# Patient Record
(demographics unavailable — no encounter records)

---

## 2025-01-07 NOTE — HISTORY OF PRESENT ILLNESS
[EENT/Resp Symptoms] : EENT/RESPIRATORY SYMPTOMS [Nasal congestion] : nasal congestion [___ Day(s)] : [unfilled] day(s) [Fever] : fever [Nasal Congestion] : nasal congestion [Cough] : cough [Max Temp: ____] : Max temperature: [unfilled] [de-identified] : fever [FreeTextEntry6] : Yamilka initially developed flu-like symptoms including body aches and fever starting about 4 days ago. Her father reports that Yamilka tested negative for COVID-19 and flu at that time. Last Friday, she experienced significant fatigue, sleeping most of the day. Diarrhea began shortly after, occurring 2-3 times daily but not severe. The fever initially broke for about a day and a half, but returned last night, prompting the father's concern. The father notes that Yamilka has been experiencing a dry cough.

## 2025-01-07 NOTE — PHYSICAL EXAM
[Acute Distress] : no acute distress [Alert] : alert [Tired appearing] : tired appearing [Lethargic] : not lethargic [Toxic] : not toxic [Clear] : right tympanic membrane clear [Clear Rhinorrhea] : clear rhinorrhea [NL] : warm, clear

## 2025-01-07 NOTE — HISTORY OF PRESENT ILLNESS
[EENT/Resp Symptoms] : EENT/RESPIRATORY SYMPTOMS [Nasal congestion] : nasal congestion [___ Day(s)] : [unfilled] day(s) [Fever] : fever [Nasal Congestion] : nasal congestion [Cough] : cough [Max Temp: ____] : Max temperature: [unfilled] [de-identified] : fever [FreeTextEntry6] : Yamilka initially developed flu-like symptoms including body aches and fever starting about 4 days ago. Her father reports that Yamilka tested negative for COVID-19 and flu at that time. Last Friday, she experienced significant fatigue, sleeping most of the day. Diarrhea began shortly after, occurring 2-3 times daily but not severe. The fever initially broke for about a day and a half, but returned last night, prompting the father's concern. The father notes that Yamilka has been experiencing a dry cough.

## 2025-01-07 NOTE — DISCUSSION/SUMMARY
[FreeTextEntry1] : Objective:   - **Diagnostic Results:** Positive test for Influenza A  Assessment and Plan:   - **Influenza A:** Patient has tested positive for Influenza A, presenting with typical symptoms including fever, cough, congestion, and fatigue. The waxing and waning pattern of fever is consistent with the flu.     - Therapeutic Interventions: Continue symptomatic treatment with Tylenol for fever. Avoid ibuprofen due to gastrointestinal symptoms.      - Patient Education: Advised to drink plenty of water and eat light foods like crackers or toast.      - Home Care: Recommended use of a humidifier and saline nebulizer twice daily for dry cough.      - Follow-Up: Patient to stay home from school for the entire week. Mother instructed to monitor for signs of secondary infections such as pneumonia or ear infections.      - Return to School: Cleared to return to school on Monday with a doctor's note.

## 2025-02-10 NOTE — HISTORY OF PRESENT ILLNESS
[de-identified] : GI Symptoms [FreeTextEntry6] : Child has been vomiting since Friday, stomachache, diarrhea, dad noticed blood in stool. TMAX at home 102.5- yesterday

## 2025-02-10 NOTE — HISTORY OF PRESENT ILLNESS
[de-identified] : GI Symptoms [FreeTextEntry6] : Child has been vomiting since Friday, stomachache, diarrhea, dad noticed blood in stool. TMAX at home 102.5- yesterday

## 2025-02-13 NOTE — PHYSICAL EXAM
[Alert] : alert [Clear] : right tympanic membrane clear [Soft] : soft [Normal Bowel Sounds] : normal bowel sounds [Normotonic] : normotonic [NL] : warm, clear [Acute Distress] : no acute distress [Tired appearing] : not tired appearing [Toxic] : not toxic [Tender] : nontender [Distended] : nondistended [Hepatosplenomegaly] : no hepatosplenomegaly [Tenderness with Palpation] : no tenderness with palpation [McBurney's point tenderness] : no McBurney's point tenderness [Rebound tenderness] : no rebound tenderness [Psoas Sign Positive] : psoas sign negative [Obturator Sign Positive] : obturator sign negative

## 2025-02-13 NOTE — DISCUSSION/SUMMARY
[FreeTextEntry1] : Assessment and Plan:   - **Bloody Stools:**    - **assess:** It was assessed that Yamilka's abdominal discomfort resolved on its own with unclear cause. Diagnostics including inflammatory markers and complete blood count were ordered to be available the next day. If everything is ok with the blood work, then there's a chance of low likelihood of ongoing inflammation or an inflammatory process. Will be followed up through the weekend.     - Therapeutic Interventions: Current plan is observational and symptomatic treatment.      - Diagnostic Tests: Inflammatory markers, Complete Blood Count, possible stool test for bacterial panel ordered.      - Referrals: Referred to GI     - Patient Education: Advised on the possible reasons for her symptoms and the current plan of action      - Follow-Up: Planned to follow up after results of investigations are out.    - **Skin rash/Dermatitis:**    - **assess:** It was assessed that Yamilka's skin rash is likely due to the temperature changes affecting her dry skin. Advised to moisturize with coconut oil.     - Therapeutic Interventions: Recommended application of coconut oil to the affected area.      - Patient Education: Explained the possible cause of the rash and it's management.      - Follow-Up: As needed for her dry skin

## 2025-02-13 NOTE — HISTORY OF PRESENT ILLNESS
[de-identified] : bloody stools [FreeTextEntry6] : Yamilka presented with two chief complaints: abdominal discomfort with bloody stools and resolving diarrhea and a skin rash on her body and legs. The onset of the symptoms was 5 days ago with diarrhea that started having blood, but the abdominal discomfort seems to have ceased for the time being. The rash started a couple of days ago. Brought in stool for culture earlier in the week which is negative for 48hrs, pending final results.